# Patient Record
Sex: FEMALE | Race: WHITE | URBAN - METROPOLITAN AREA
[De-identification: names, ages, dates, MRNs, and addresses within clinical notes are randomized per-mention and may not be internally consistent; named-entity substitution may affect disease eponyms.]

---

## 2019-08-29 ENCOUNTER — TELEPHONE (OUTPATIENT)
Dept: NEUROLOGY | Age: 24
End: 2019-08-29
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
## 2019-09-05 ENCOUNTER — TELEPHONE (OUTPATIENT)
Dept: NEUROLOGY | Age: 24
End: 2019-09-05

## 2019-09-05 NOTE — TELEPHONE ENCOUNTER
----- Message from Alva Arboleda sent at 9/4/2019  4:14 PM EDT -----  Regarding: Dr. Candido James  General Message/Vendor Calls    Caller's first and last name: Flory Joshi      Reason for call: Schedule a new patient appointment for psych evaluation and mood stabilization. Referred by Dr. Marietta Lefort  [phone].       Callback required yes/no and why: Yes      Best contact number(s): [phone]      Details to clarify the request:      Alva Arboleda

## 2019-09-06 NOTE — TELEPHONE ENCOUNTER
Per note on 08/29/19 I called referring Dr and requested referral as well as medical notes. I have not received them as of yet. I called pt that day to advise. Today I called referring office again and requested records and referral to be faxed to us. Called pt again and left v/m with details and to possible reach out to referring dr to make sure they get referral over to us before appt can be scheduled. Need to make sure is a Neuropsych pt and not just psych.

## 2019-09-09 ENCOUNTER — TELEPHONE (OUTPATIENT)
Dept: NEUROLOGY | Age: 24
End: 2019-09-09

## 2019-09-09 NOTE — TELEPHONE ENCOUNTER
Received call back from Jorge Nguyễn and she advised she did not refer pt to be neuropscyh tested but psych tested. Called pt to advise and left v/m. Pt should most likely reach out to Therapist to be referred to correct provider.

## 2020-05-14 ENCOUNTER — VIRTUAL VISIT (OUTPATIENT)
Dept: NEUROLOGY | Age: 25
End: 2020-05-14

## 2020-05-14 DIAGNOSIS — R45.86 MOOD SWINGS: ICD-10-CM

## 2020-05-14 DIAGNOSIS — F42.2 MIXED OBSESSIONAL THOUGHTS AND ACTS: ICD-10-CM

## 2020-05-14 DIAGNOSIS — F43.10 PTSD (POST-TRAUMATIC STRESS DISORDER): Primary | ICD-10-CM

## 2020-05-14 DIAGNOSIS — R41.840 INATTENTION: ICD-10-CM

## 2020-05-14 DIAGNOSIS — F32.A ANXIETY AND DEPRESSION: ICD-10-CM

## 2020-05-14 DIAGNOSIS — F41.9 ANXIETY AND DEPRESSION: ICD-10-CM

## 2020-05-14 DIAGNOSIS — R45.4 IRRITABILITY: ICD-10-CM
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
## 2020-06-10 ENCOUNTER — OFFICE VISIT (OUTPATIENT)
Dept: NEUROLOGY | Age: 25
End: 2020-06-10

## 2020-06-10 VITALS — TEMPERATURE: 97.6 F

## 2020-06-10 DIAGNOSIS — R45.4 IRRITABILITY: ICD-10-CM

## 2020-06-10 DIAGNOSIS — F42.2 MIXED OBSESSIONAL THOUGHTS AND ACTS: ICD-10-CM

## 2020-06-10 DIAGNOSIS — F32.2 SEVERE MAJOR DEPRESSION (HCC): ICD-10-CM

## 2020-06-10 DIAGNOSIS — F60.3 BORDERLINE PERSONALITY DISORDER (HCC): ICD-10-CM

## 2020-06-10 DIAGNOSIS — F90.0 ATTENTION DEFICIT HYPERACTIVITY DISORDER (ADHD), INATTENTIVE TYPE, MILD: ICD-10-CM

## 2020-06-10 DIAGNOSIS — F45.0 ANXIETY WITH SOMATIZATION: ICD-10-CM

## 2020-06-10 DIAGNOSIS — F41.9 ANXIETY WITH SOMATIZATION: ICD-10-CM

## 2020-06-10 DIAGNOSIS — F43.10 PTSD (POST-TRAUMATIC STRESS DISORDER): Primary | ICD-10-CM
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
## 2020-07-08 ENCOUNTER — OFFICE VISIT (OUTPATIENT)
Dept: NEUROLOGY | Age: 25
End: 2020-07-08

## 2020-07-08 DIAGNOSIS — F90.0 ATTENTION DEFICIT HYPERACTIVITY DISORDER (ADHD), INATTENTIVE TYPE, MILD: ICD-10-CM

## 2020-07-08 DIAGNOSIS — F60.3 BORDERLINE PERSONALITY DISORDER (HCC): ICD-10-CM

## 2020-07-08 DIAGNOSIS — F32.2 SEVERE MAJOR DEPRESSION (HCC): ICD-10-CM

## 2020-07-08 DIAGNOSIS — F42.2 MIXED OBSESSIONAL THOUGHTS AND ACTS: ICD-10-CM

## 2020-07-08 DIAGNOSIS — R45.4 IRRITABILITY: ICD-10-CM

## 2020-07-08 DIAGNOSIS — F41.9 ANXIETY WITH SOMATIZATION: ICD-10-CM

## 2020-07-08 DIAGNOSIS — F45.0 ANXIETY WITH SOMATIZATION: ICD-10-CM

## 2020-07-08 DIAGNOSIS — F43.10 PTSD (POST-TRAUMATIC STRESS DISORDER): Primary | ICD-10-CM
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
## 2020-07-08 NOTE — PROGRESS NOTES
This is a teleneuropsychology (audio/visual) visit that was performed with in the originating site at patient's home and the distance site at [facility] outpatient clinic at [place]. Verbal consent to participate in the video visit was obtained. This visit occurred during the corona (COVID -19) public health emergency and these visits were authorized by the President of the [place]. I discussed with the patient the nature of our teleneuropsych visit in that :    - I would evaluate the patient and recommend diagnostics and treatment based on my assessment and impressions, and/or provided test results and discussed these issues with the patient and/or family.    - Our sessions are not being recorded and that personal health information is protected    - Our team will provide follow-up care in person if when the patient needs it. Prior to seeing the patient I reviewed the records, including the previously completed report, the records in [place], and any updated visits from other providers since I saw the patient last.      Today, I engaged in a psychoeducational and supportive psychotherapy and feedback session with the patient via teleneuropsychology. I reviewed the results of the recent Neuropsychological Evaluation, including discussing individual tests as well as patient's areas of neurocognitive strength versus weakness. 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 Her IQ scores vary from the superior to average range. Her learning, memory, and performances across all other neurocognitive domains assessed were within the normal range. From a psychiatric standpoint, there are serious concerns. This is a case of severe, complex trauma. She has severe PTSD. She also has severe depression, severe anxiety with somatization, and borderline personality disorder.   I do not see her as having bipolar disorder. Substance abuse issues appear to be trauma related as well. Her anxiety is severe to the degree whereby her ability to focus and to attend are markedly compromised.                  The pattern of normal versus abnormal neurocognitive test scores suggests that ADHD is a separate issue from emotional distress. The former is organic and the latter a combination of organic and functional problems. In addition to continued medical care, my recommendations include a review of her psychiatric medication management for severe depression and anxiety. Continued active engagement in intensive psychotherapy is strongly advised. Consider EMDR. Consider [address] [place]. Consider DBT. The ADHD- Inattentive issue is mild, and I do not believe it is worth medically treating that issue for now. This is severe/complex psychiatric distress. We now have baseline data on her. Follow up prn. Clinical correlation is, of course, indicated.                   I will discuss these findings with the patient when she follows up with me in the near future. A follow up Neuropsychological Evaluation is indicated on a prn basis, especially if there are any cognitive and/or emotional changes.       DIAGNOSES: Severe, Complex PTSD                          Anxiety with Somatization, Severe                          Depression with Somatization                          Borderline Personality Disorder                          ADHD, Inattentive, Mild                          History of Substance Dependence                          Rule out OCD                     Education was provided regarding my diagnostic impressions, and we discussed treatment plan/options. I also answered numerous questions related to the clinical findings, including discussing various methods to improve cognition and mood. Counseling provided regarding mood and cognition. CBT and supportive psychotherapy techniques were utilized.   Supportive/Cognitive Behavioral/Solution Focused psychotherapy provided  Discussed rational versus irrational thinking patterns and their consequences. Discussed healthy/adaptive and unhealthy/maladaptive coping. The patient needs to follow with pcp, psychiatry. She is doing better,. Specific areas which were addressed include: cognitive issues, recent hospitalization, change of diet    The patient had the following concerns which I deferred to their referring provider: meds      Time spent today:  30    Pursuant to the emergency declaration under the [address] and the [address] authority and the [employer] Act, this Virtual  Visit (audio/visual) was conducted, with patient's consent, to reduce the patient's risk of exposure to COVID-19 and provide continuity of care. Services were provided in this manner to substitute for in-person clinic visit.